# Patient Record
Sex: MALE | Employment: FULL TIME | URBAN - METROPOLITAN AREA
[De-identification: names, ages, dates, MRNs, and addresses within clinical notes are randomized per-mention and may not be internally consistent; named-entity substitution may affect disease eponyms.]

---

## 2022-07-08 ENCOUNTER — TELEPHONE (OUTPATIENT)
Dept: BEHAVIORAL HEALTH | Facility: CLINIC | Age: 47
End: 2022-07-08

## 2022-07-08 ENCOUNTER — HOSPITAL ENCOUNTER (INPATIENT)
Facility: CLINIC | Age: 47
LOS: 3 days | Discharge: HOME | End: 2022-07-15
Attending: FAMILY MEDICINE | Admitting: PSYCHIATRY & NEUROLOGY
Payer: COMMERCIAL

## 2022-07-08 DIAGNOSIS — F33.9 RECURRENT MAJOR DEPRESSIVE DISORDER, REMISSION STATUS UNSPECIFIED (H): ICD-10-CM

## 2022-07-08 DIAGNOSIS — F32.1 CURRENT MODERATE EPISODE OF MAJOR DEPRESSIVE DISORDER, UNSPECIFIED WHETHER RECURRENT (H): ICD-10-CM

## 2022-07-08 DIAGNOSIS — Z20.822 LAB TEST NEGATIVE FOR COVID-19 VIRUS: ICD-10-CM

## 2022-07-08 DIAGNOSIS — R45.851 SUICIDAL IDEATION: ICD-10-CM

## 2022-07-08 LAB
AMPHETAMINES UR QL SCN: NORMAL
BARBITURATES UR QL: NORMAL
BENZODIAZ UR QL: NORMAL
CANNABINOIDS UR QL SCN: NORMAL
COCAINE UR QL: NORMAL
OPIATES UR QL SCN: NORMAL
SARS-COV-2 RNA RESP QL NAA+PROBE: NEGATIVE

## 2022-07-08 PROCEDURE — 90791 PSYCH DIAGNOSTIC EVALUATION: CPT

## 2022-07-08 PROCEDURE — 80307 DRUG TEST PRSMV CHEM ANLYZR: CPT | Performed by: FAMILY MEDICINE

## 2022-07-08 PROCEDURE — C9803 HOPD COVID-19 SPEC COLLECT: HCPCS | Performed by: FAMILY MEDICINE

## 2022-07-08 PROCEDURE — 99285 EMERGENCY DEPT VISIT HI MDM: CPT | Mod: 25 | Performed by: FAMILY MEDICINE

## 2022-07-08 PROCEDURE — 87635 SARS-COV-2 COVID-19 AMP PRB: CPT | Performed by: FAMILY MEDICINE

## 2022-07-08 PROCEDURE — 250N000013 HC RX MED GY IP 250 OP 250 PS 637: Performed by: FAMILY MEDICINE

## 2022-07-08 PROCEDURE — 99285 EMERGENCY DEPT VISIT HI MDM: CPT | Performed by: FAMILY MEDICINE

## 2022-07-08 RX ORDER — METOPROLOL SUCCINATE 25 MG/1
25 TABLET, EXTENDED RELEASE ORAL DAILY
COMMUNITY

## 2022-07-08 RX ORDER — ACETAMINOPHEN 325 MG/1
325-650 TABLET ORAL EVERY 6 HOURS PRN
Status: ON HOLD | COMMUNITY
End: 2022-07-15

## 2022-07-08 RX ORDER — ZOLPIDEM TARTRATE 5 MG/1
5 TABLET ORAL
Status: DISCONTINUED | OUTPATIENT
Start: 2022-07-08 | End: 2022-07-12

## 2022-07-08 RX ORDER — BUPROPION HYDROCHLORIDE 75 MG/1
75 TABLET ORAL DAILY
Status: DISCONTINUED | OUTPATIENT
Start: 2022-07-09 | End: 2022-07-15 | Stop reason: HOSPADM

## 2022-07-08 RX ORDER — BUPROPION HYDROCHLORIDE 150 MG/1
150 TABLET ORAL EVERY MORNING
Status: ON HOLD | COMMUNITY
End: 2022-07-15

## 2022-07-08 RX ORDER — BUPROPION HYDROCHLORIDE 150 MG/1
150 TABLET ORAL DAILY
Status: DISCONTINUED | OUTPATIENT
Start: 2022-07-09 | End: 2022-07-15 | Stop reason: HOSPADM

## 2022-07-08 RX ORDER — FAMOTIDINE 20 MG/1
20 TABLET, FILM COATED ORAL DAILY PRN
Status: DISCONTINUED | OUTPATIENT
Start: 2022-07-08 | End: 2022-07-15 | Stop reason: HOSPADM

## 2022-07-08 RX ORDER — LISINOPRIL 10 MG/1
10 TABLET ORAL DAILY
COMMUNITY

## 2022-07-08 RX ORDER — BUPROPION HYDROCHLORIDE 75 MG/1
75 TABLET ORAL DAILY
Status: ON HOLD | COMMUNITY
End: 2022-07-15

## 2022-07-08 RX ORDER — FAMOTIDINE 20 MG/1
20 TABLET, FILM COATED ORAL 2 TIMES DAILY PRN
COMMUNITY

## 2022-07-08 RX ORDER — ROSUVASTATIN CALCIUM 10 MG/1
10 TABLET, COATED ORAL DAILY
Status: DISCONTINUED | OUTPATIENT
Start: 2022-07-09 | End: 2022-07-08

## 2022-07-08 RX ORDER — FAMOTIDINE 20 MG/1
20 TABLET, FILM COATED ORAL DAILY
Status: DISCONTINUED | OUTPATIENT
Start: 2022-07-08 | End: 2022-07-08

## 2022-07-08 RX ORDER — LORATADINE 10 MG/1
10 TABLET ORAL DAILY
Status: ON HOLD | COMMUNITY
End: 2022-07-15

## 2022-07-08 RX ORDER — ZOLPIDEM TARTRATE 5 MG/1
5 TABLET ORAL
Status: ON HOLD | COMMUNITY
End: 2022-07-15

## 2022-07-08 RX ORDER — ROSUVASTATIN CALCIUM 10 MG/1
10 TABLET, COATED ORAL DAILY
Status: DISCONTINUED | OUTPATIENT
Start: 2022-07-08 | End: 2022-07-15 | Stop reason: HOSPADM

## 2022-07-08 RX ORDER — METOPROLOL SUCCINATE 25 MG/1
25 TABLET, EXTENDED RELEASE ORAL DAILY
Status: DISCONTINUED | OUTPATIENT
Start: 2022-07-08 | End: 2022-07-15 | Stop reason: HOSPADM

## 2022-07-08 RX ORDER — ROSUVASTATIN CALCIUM 10 MG/1
10 TABLET, COATED ORAL DAILY
COMMUNITY

## 2022-07-08 RX ORDER — LISINOPRIL 5 MG/1
10 TABLET ORAL DAILY
Status: DISCONTINUED | OUTPATIENT
Start: 2022-07-08 | End: 2022-07-15 | Stop reason: HOSPADM

## 2022-07-08 RX ORDER — LORAZEPAM 0.5 MG/1
0.5 TABLET ORAL 2 TIMES DAILY PRN
Status: ON HOLD | COMMUNITY
End: 2022-07-15

## 2022-07-08 RX ADMIN — ROSUVASTATIN CALCIUM 10 MG: 10 TABLET, FILM COATED ORAL at 20:41

## 2022-07-08 RX ADMIN — RIVAROXABAN 20 MG: 10 TABLET, FILM COATED ORAL at 18:42

## 2022-07-08 RX ADMIN — LISINOPRIL 10 MG: 10 TABLET ORAL at 20:42

## 2022-07-08 RX ADMIN — METOPROLOL SUCCINATE 25 MG: 25 TABLET, EXTENDED RELEASE ORAL at 20:41

## 2022-07-08 NOTE — ED TRIAGE NOTES
Triage Assessment     Row Name 07/08/22 1238       Triage Assessment (Adult)    Airway WDL WDL       Respiratory WDL    Respiratory WDL WDL       Skin Circulation/Temperature WDL    Skin Circulation/Temperature WDL WDL       Cardiac WDL    Cardiac WDL WDL       Peripheral/Neurovascular WDL    Peripheral Neurovascular WDL WDL       Cognitive/Neuro/Behavioral WDL    Cognitive/Neuro/Behavioral WDL WDL
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
## 2022-07-08 NOTE — PHARMACY-ADMISSION MEDICATION HISTORY
Admission Medication History Completed by Pharmacy    See [facility] Admission Navigator for allergy information, preferred outpatient pharmacy, prior to admission medications and immunization status.     Medication History Sources:     Patient interview    Chart review- [facility] Notes from 7/5/2022 (nothing in surescripts)    Changes made to PTA medication list (reason):    Added: acetaminophen, bupropion, famotidine, lisinopril, loratadine, lorazepam, metoprolol, rivaroxaban, zolpidem, rosuvastatin     Deleted: celexa, hydroxyzine, risperidone     Changed: none    Prior to Admission medications    Medication Sig Last Dose Taking? Auth Provider Long Term End Date   acetaminophen (TYLENOL) 325 MG tablet Take 325-650 mg by mouth every 6 hours as needed for mild pain Unknown at Unknown time Yes Unknown, Entered By History     buPROPion (WELLBUTRIN XL) 150 MG 24 hr tablet Take 150 mg by mouth every morning With 75 mg for total of 225 mg 7/8/2022 at 0800 Yes Unknown, Entered By History     buPROPion (WELLBUTRIN) 75 MG tablet Take 75 mg by mouth daily With 150 mg for total of 225 mg 7/8/2022 at 0800 Yes Unknown, Entered By History     famotidine (PEPCID) 20 MG tablet Take 20 mg by mouth 2 times daily as needed More than a month at Unknown time Yes Unknown, Entered By History     lisinopril (ZESTRIL) 10 MG tablet Take 10 mg by mouth daily 07/07/2022 at 2200 Yes Unknown, Entered By History     loratadine (CLARITIN) 10 MG tablet Take 10 mg by mouth daily 7/7/2022 at 2200 Yes Unknown, Entered By History     LORazepam (ATIVAN) 0.5 MG tablet Take 0.5 mg by mouth 2 times daily as needed for anxiety 7/8/2022 Yes Unknown, Entered By History     metoprolol succinate ER (TOPROL XL) 25 MG 24 hr tablet Take 25 mg by mouth daily 7/7/2022 at 2200 Yes Unknown, Entered By History     rivaroxaban ANTICOAGULANT (XARELTO) 20 MG TABS tablet Take 20 mg by mouth daily (with dinner) 7/7/2022 at 1700 Yes Unknown, Entered By History      rosuvastatin (CRESTOR) 10 MG tablet Take 10 mg by mouth daily 7/7/2022 at 2200 Yes Unknown, Entered By History     zolpidem (AMBIEN) 5 MG tablet Take 5 mg by mouth nightly as needed for sleep More than a month at Unknown time Yes Unknown, Entered By History         Date completed: 07/08/22    Medication history completed by: [facility] Cel[facility]
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
## 2022-07-08 NOTE — SAFE
Charles Schoeneberger  July 8, 2022  SAFE Note    Critical Safety Issues: Aggression, Suicidal Ideation      Current Suicidal Ideation/Self-Injurious Concerns/Methods: Hitting/Punching Self and Other Patient thinking about hanging themselves, and crashing their car.       Current or Historical Inappropriate Sexual Behavior: No      Current or Historical Aggression/Homicidal Ideation: Rage      Triggers: Unknown     Guardianship Status: is his own guardian.. Guardianship paperwork is not required.    This patient is a child/adolescent: No    This patient has additional special visitor precautions: No    Updated care team: Yes:     For additional details see full LMHP assessment.       Neetu Braga MS  Licensed Mental Health Professional   Extended Care

## 2022-07-09 ENCOUNTER — TELEPHONE (OUTPATIENT)
Dept: BEHAVIORAL HEALTH | Facility: CLINIC | Age: 47
End: 2022-07-09

## 2022-07-09 PROCEDURE — 250N000013 HC RX MED GY IP 250 OP 250 PS 637: Performed by: FAMILY MEDICINE

## 2022-07-09 RX ADMIN — METOPROLOL SUCCINATE 25 MG: 25 TABLET, EXTENDED RELEASE ORAL at 20:45

## 2022-07-09 RX ADMIN — BUPROPION HYDROCHLORIDE 75 MG: 75 TABLET, FILM COATED ORAL at 09:27

## 2022-07-09 RX ADMIN — LISINOPRIL 10 MG: 10 TABLET ORAL at 20:45

## 2022-07-09 RX ADMIN — ROSUVASTATIN CALCIUM 10 MG: 10 TABLET, FILM COATED ORAL at 20:44

## 2022-07-09 RX ADMIN — BUPROPION HYDROCHLORIDE 150 MG: 150 TABLET, EXTENDED RELEASE ORAL at 09:27

## 2022-07-09 RX ADMIN — RIVAROXABAN 20 MG: 10 TABLET, FILM COATED ORAL at 20:45

## 2022-07-09 NOTE — TELEPHONE ENCOUNTER
R: Pt currently at [facility] awaiting bed placement     3:45pm- Bed search update    [facility] is at capacity.    All of [facility] locations are capped.    [facility] has very low acuity only.  Capped for acuity.  Main#[phone]    [facility] is at capacity.    [facility] has very low acuity only.  Capped at acuity.  Pt meets exclusionary criteria      [facility] posted 3 beds.  Low acuity only.  Pt meets exclusionary criteria    [facility] [place] posted 1 bed.  Voluntary Pts only.  NO 72 HH.  No hx of aggression, violence, or assault.     [facility] posted 6 beds.  NO aggressive/violent behaviors, or recent hx of such in last 2 years.  Must have cognitive ability to do programming.  Mh must be primary.  Negative covid test required. Pt meets exclusionary criteria    [facility] posted 2 low acuity beds.  COVID negative. Pt meets exclusionary criteria    [facility] is at capacity.    [facility] is at capacity.    4:11p Intake called [facility] Nurse to inquire about pt's placement preferences. [facility] Nurse to call back.     4:27p Intake received call from [facility] Nurse who inform pt is open to [facility], but prefers [facility]. Nurse notes that there has not been any problems with the pt, pt was completing a puzzle.    4:30p Pt remains on wait list pending appropriate bed availability.

## 2022-07-09 NOTE — ED NOTES
Pt has been calm and cooperative all shift. Has spent the majority of the shift in the lounge, putting a puzzle together, reading a book and engaging with other patients.

## 2022-07-09 NOTE — TELEPHONE ENCOUNTER
0552 Bed Search Update:    [facility] [place]-[facility] beds available  [facility]-No beds available  [facility]-No beds available  [facility]-No beds available  [facility]-No beds available  [facility]-No beds available  [facility]-No beds available  [facility]-No beds available  [facility]-No beds available  [facility]-No beds available.  Low acuity only.  Mixed unit adol/adult/juan antonio  [facility]-No beds available  [facility]-No beds available  [facility]-Low acuity only  [facility]-No beds available  [facility]-Low acuity only  [facility]-Low acuity only  [facility]-No beds available.  Must be on a 72 HH. No intake after 10 PM.  [facility]-No beds available  [facility]-Voluntary/[place] only. No hx violence or sexual assault.  [facility]-No beds available  [facility]-No beds available  [facility]-Meets exclusionary criteria. No recent hx violence/aggression. Must be able to program in groups.  [facility]-Low acuity only.  [facility] [place] reporting they are on divert.  No recent violence or aggression.  [facility]-No beds available  [facility]-No beds available  [facility]-No beds available    Remains on wait list.   maximum assist (25% patients effort)

## 2022-07-09 NOTE — TELEPHONE ENCOUNTER
Inpatient Bed Call Log 7/9/2022 Morning done 700a    Adults:         [facility] is posting 0 beds.     [facility] is posting 0 beds.    [facility] is posting 0 beds.    [facility] is posting 0 beds.    [facility] is posting 0 beds.    [facility] is posting 0 beds.    [facility] is posting 0 beds.    [facility] is posting 0 beds.    [facility] is posting 0 beds. Mixed unit 12+. Low acuity only.     [facility] is positing 0 beds. No aggression.     [facility] is posting 0 beds.     [facility] is posting 1 bed.     [facility] is posting 0 beds. Very low acuity.    [facility] is posting 3 beds. Low acuity.     [facility] is posting 3 beds. Low acuity only. 72 hr hold required.     [facility] is posting 1 bed. Low acuity.    [facility] is posting 0 beds. Vol only, No Hx of aggression, violence or assault. No sexual offenders. No 72 hr holds.    [facility] is posting 6 beds. (Must have the cognitive ability to do programming. No aggressive or violent behavior or recent HX in the last 2 yrs. MH must be primary.)    [facility] is posting 0 beds. Low acuity only. Violence and aggression capped.     [facility] is posting 2 beds. Low acuity, Neg Covid.     [facility] is posting 0 beds. Covid neg. Vol only. Combined adolescent and adult unit. No aggressive or violent behavior. No registered sex offenders.     [facility] is posting 10 beds. Call for details.    [facility] is posting 0 beds.    7:30am No appropriate bed available at this time.     8:12am Intake called [facility]. Pt was declined during phone screening due to acuity.

## 2022-07-10 ENCOUNTER — TELEPHONE (OUTPATIENT)
Dept: BEHAVIORAL HEALTH | Facility: CLINIC | Age: 47
End: 2022-07-10

## 2022-07-10 PROCEDURE — 250N000013 HC RX MED GY IP 250 OP 250 PS 637: Performed by: FAMILY MEDICINE

## 2022-07-10 RX ORDER — ACETAMINOPHEN 500 MG
1000 TABLET ORAL ONCE
Status: COMPLETED | OUTPATIENT
Start: 2022-07-10 | End: 2022-07-10

## 2022-07-10 RX ADMIN — ROSUVASTATIN CALCIUM 10 MG: 10 TABLET, FILM COATED ORAL at 21:06

## 2022-07-10 RX ADMIN — BUPROPION HYDROCHLORIDE 75 MG: 75 TABLET, FILM COATED ORAL at 08:57

## 2022-07-10 RX ADMIN — ACETAMINOPHEN 1000 MG: 500 TABLET ORAL at 14:13

## 2022-07-10 RX ADMIN — RIVAROXABAN 20 MG: 10 TABLET, FILM COATED ORAL at 19:23

## 2022-07-10 RX ADMIN — LISINOPRIL 10 MG: 10 TABLET ORAL at 21:06

## 2022-07-10 RX ADMIN — METOPROLOL SUCCINATE 25 MG: 25 TABLET, EXTENDED RELEASE ORAL at 21:06

## 2022-07-10 RX ADMIN — ACETAMINOPHEN 1000 MG: 500 TABLET ORAL at 09:42

## 2022-07-10 RX ADMIN — BUPROPION HYDROCHLORIDE 150 MG: 150 TABLET, EXTENDED RELEASE ORAL at 08:57

## 2022-07-10 NOTE — ED PROVIDER NOTES
[facility] ED Mental Health Handoff Note:       Brief HPI:  This is a 46 year old male signed out to me by Dr. Escamilla.  See initial ED Provider note for full details of the presentation. Interval history is pertinent for no new events.    Home meds reviewed and ordered/administered: Yes    Medically stable for inpatient mental health admission: Yes.    Evaluated by mental health: Yes. The recommendation is for inpatient mental health treatment. Bed search in process    Safety concerns: At the time I received sign out, there were no safety concerns.    Hold Status:  Active Orders   N/A            Exam:   Patient Vitals for the past 24 hrs:   BP Temp Temp src Pulse Resp SpO2   07/09/22 2019 114/74 97.7  F (36.5  C) Oral 74 18 98 %   07/09/22 0910 117/78 97.7  F (36.5  C) Oral 72 18 99 %           ED Course:    Medications   buPROPion (WELLBUTRIN XL) 24 hr tablet 150 mg (150 mg Oral Given 7/9/22 0927)   buPROPion (WELLBUTRIN) tablet 75 mg (75 mg Oral Given 7/9/22 0927)   lisinopril (ZESTRIL) tablet 10 mg (10 mg Oral Given 7/9/22 2045)   metoprolol succinate ER (TOPROL XL) 24 hr tablet 25 mg (25 mg Oral Given 7/9/22 2045)   rivaroxaban ANTICOAGULANT (XARELTO) tablet 20 mg (20 mg Oral Given 7/9/22 2045)   zolpidem (AMBIEN) tablet 5 mg (has no administration in time range)   rosuvastatin (CRESTOR) tablet 10 mg (10 mg Oral Given 7/9/22 2044)   famotidine (PEPCID) tablet 20 mg (has no administration in time range)            There were no significant events during my shift.    Patient was signed out to the oncoming provider      Impression:    ICD-10-CM    1. Current moderate episode of major depressive disorder, unspecified whether recurrent (H)  F32.1    2. Suicidal ideation  R45.851        Plan:    1. Awaiting inpatient mental health admission/transfer.      RESULTS:   No results found for this visit on 07/08/22 (from the past 24 hour(s)).          MD Haris Rubio David,  MD  07/10/22 0707

## 2022-07-10 NOTE — ED NOTES
Triage & Transition Services, Extended Care     Charles Schoeneberger  July 10, 2022    Philip is followed related to Boarding Status. Please see initial DEC Crisis Assessment completed for complete assessment information. Medical record is reviewed. While patient is in the ED, care team is working towards Learn and Demonstrate at Least One Skill Focused on Crisis Stabilization.     There are not significant status changes. Full DEC Reassessment is not recommended at this time. Extended Care continues to be available for review of changes to initial crisis state resulting in this encounter.     Extended Care will follow and meet with patient/family/care team as able or requested.     Neetu Braga MS   [facility], Extended Care   [phone]

## 2022-07-10 NOTE — TELEPHONE ENCOUNTER
Inpatient Bed Call Log 7/10/2022 Morning done 7am    Adults:            Within [facility] is posting 0 beds.     [facility] is posting 0 beds.    [facility] is posting 0 beds.    [facility] is posting 0 beds.    [facility] is posting 0 beds.    [facility] is posting 0 beds.    [facility] is posting 0 beds.    [facility] is posting 0 beds.    [facility] is posting 1 bed. Mixed unit 12+. Low acuity only.     [facility] is positing 0 beds. No aggression.     [facility] is posting 0 beds.     [facility] is posting 2 beds.     [facility] is posting 0 beds. Very low acuity.    [facility] is posting 2 beds. Low acuity.     [facility] is posting 2 beds. Low acuity only. 72 hr hold required.     [facility] is posting 1 bed. Low acuity.     is posting 1 bed. Vol only, No Hx of aggression, violence or assault. No sexual offenders. No 72 hr holds.    [facility] is posting 7 beds. (Must have the cognitive ability to do programming. No aggressive or violent behavior or recent HX in the last 2 yrs. MH must be primary.)    [facility] is posting 0 beds. Low acuity only. Violence and aggression capped.     [facility] is posting 2 beds. Low acuity, Neg Covid.     [facility] is posting 0 beds. Covid neg. Vol only. Combined adolescent and adult unit. No aggressive or violent behavior. No registered sex offenders.     [facility] is posting 10 beds. Call for details.    [facility] is posting 0 beds.    7:30am No appropriate bed available.

## 2022-07-10 NOTE — TELEPHONE ENCOUNTER
R: 3:35pm- Bed Search Update: [facility] is at capacity.  [facility] is at capacity.  [facility] is at capacity.  [facility] is at capacity.  [facility] is at capacity.  [facility] is at capacity.  [facility] posted 1 bed.  Mixed unit with adolescents, adults, and geriatric patients.  No aggression.  No 72hh.  [facility] is at [facility].  [facility] posted 2 beds for very low acuity only.   [facility] posted 2 beds for very low acuity.    [facility] posted 1 bed for very low acuity.    [facility] is at capacity.  [facility] [place] posted 1 bed.  Voluntary only.  No 72HH.  No aggression/assault, violence.    [facility] posted 6 beds.  Must have cognitive ability to do programming.  No aggressive/violent behavior or recent hx of such in last 2 yrs.  MH must be primary.  Negative COVID test required.  Phone: [phone]/ Fax: [phone].  Declined. [facility] is at capacity.  [facility] posted 2 low acuity bed.    [facility] is at capacity.  [facility] posted 1 bed.  Mixed unit.  No aggression.     Pt remains on waitlist pending available bed.

## 2022-07-10 NOTE — ED NOTES
Slept through the night.  Was on a 1:1 due to use of CPAP.  No behaviors observed during the night shift.

## 2022-07-11 PROCEDURE — 250N000013 HC RX MED GY IP 250 OP 250 PS 637: Performed by: FAMILY MEDICINE

## 2022-07-11 RX ADMIN — LISINOPRIL 10 MG: 10 TABLET ORAL at 21:53

## 2022-07-11 RX ADMIN — BUPROPION HYDROCHLORIDE 75 MG: 75 TABLET, FILM COATED ORAL at 08:32

## 2022-07-11 RX ADMIN — ROSUVASTATIN CALCIUM 10 MG: 10 TABLET, FILM COATED ORAL at 21:53

## 2022-07-11 RX ADMIN — RIVAROXABAN 20 MG: 10 TABLET, FILM COATED ORAL at 19:10

## 2022-07-11 RX ADMIN — METOPROLOL SUCCINATE 25 MG: 25 TABLET, EXTENDED RELEASE ORAL at 21:53

## 2022-07-11 RX ADMIN — BUPROPION HYDROCHLORIDE 150 MG: 150 TABLET, EXTENDED RELEASE ORAL at 08:32

## 2022-07-11 NOTE — ED NOTES
"Triage & Transition Services, Extended Care     Therapy Progress Note    Patient: Philip goes by \"Philip,\" uses he/him pronouns  Date of Service: July 11, 2022  Site of Service: [facility]  Patient was seen in-person.     Presenting problem:   Philip is followed related to Long wait time for admission: 74+ hours in the ED. Please see initial DEC/[facility] Crisis Assessment completed by Neetu Omi on 7.8.2022 for complete assessment information. Notable concerns include suicidal ideation and increase in symptoms and aggressive behavior.     Individuals Present: Philip & Marcy Chin, [facility]    Session start: 11:25am   Session end: 11:48am   Session duration in minutes: 23 minutes  Session number: 1  Anticipated number of sessions or this episode of care: 1-3  CPT utilized: 83918 - Psychotherapy (with patient) - 30 (16-37*) min        Current Presentation:   Pt shared that his mood have felt unstable however, while in the ED things have felt better. He reported believes it is due to being disconnected from the stress of home and work. Pt expressed that he has actively been trying to connect with outpatient services however, is concerned about recent changes in mood and anger outburst. Pt identified that at sometime in the future he would like to reduce or taper off medication however, at this time he does not believe his medication is effective and would like support adjusting it. Pt was open to reviewing outpatient services and discussing psychiatry consult. Pt appeared anxious and was hesitant initially. He warmed to the idea when he was assured that he did not mean creating a discharge plan for today however, adding additional support while he is in the ED and starting the process to increase support in the community as well. Pt expressed that his goal is to return home however, today does not feel like he is stable to do so as he is very concerned things would go right back to how it was.  Pt has a therapist he meets " with weekly and is currently on the [facility] list [facility]. Pt has also spoke with [facility] about PHP however, the estimated wait time for opening in PHP is unknown.      Mental Status Exam:   Appearance: awake, alert  Attitude: cooperative  Eye Contact: good  Mood: anxious  Affect: intensity is blunted  Speech: clear, coherent  Psychomotor Behavior: no evidence of tardive dyskinesia, dystonia, or tics  Thought Process:  logical  Associations: no loose associations  Thought Content: active suicidal ideation present, no auditory hallucinations present and no visual hallucinations present  Insight: good  Judgement: intact  Oriented to: time, person, and place  Attention Span and Concentration: intact  Recent and Remote Memory: intact    Diagnosis:     296.30 (F33.9) Major Depressive Disorder, Recurrent Episode, Unspecified _ and With mixed features - primary and - by history     300.02 (F41.1) Generalized Anxiety Disorder - by history       Therapeutic Intervention(s):   Provided active listening, unconditional positive regard, and validation. Engaged in safety planning.  Identified and practiced coping skills.    Treatment Objective(s) Addressed:   The focus of this session was on rapport building, assessing safety and exploring obstacles to safety in the community       Case Management:   Writer spoke with pt's wife Alla via phone. She expressed concern for pt and his medication. Pt was seen at [facility] on Tuesday and discharged home with no real plan. She shared that things continued to decline for pt. Together they actively sought support and reached out to [facility] residence on Friday however, she was encouraged to bring pt into the ED because the  through [facility] believed he needed a high level of care. She shared that she would like pt to come home however, would like to see his medications reviewed and stabilization first as she is concerned things  would continue to decline. She noted that on Friday pt came angry and broke items and punched holes in the walls which is out of character for him.     General Recommendations:   Continue to monitor for harm. Consider: Allow family calls/visits and Listen in a neutral, non-judgmental way. Offer reassurance    Plan:   Inpatient mental health admission for stabilization and medication management.   Plan for Care reviewed with Assigned Medical Provider? Yes: Dr. Leonard. Discussed possible psychiatry consult however, at this time plan is for inpatient mental health admission.      Marcy Chin, [facility]   Licensed Mental Health Professional (LMHP), [facility]  [phone]

## 2022-07-12 PROBLEM — R45.851 SUICIDAL IDEATION: Status: ACTIVE | Noted: 2022-07-12

## 2022-07-12 PROCEDURE — 250N000013 HC RX MED GY IP 250 OP 250 PS 637: Performed by: FAMILY MEDICINE

## 2022-07-12 PROCEDURE — 99223 1ST HOSP IP/OBS HIGH 75: CPT | Mod: AI | Performed by: PSYCHIATRY & NEUROLOGY

## 2022-07-12 PROCEDURE — 250N000013 HC RX MED GY IP 250 OP 250 PS 637

## 2022-07-12 PROCEDURE — 124N000002 HC R&B MH UMMC

## 2022-07-12 RX ORDER — TRAZODONE HYDROCHLORIDE 50 MG/1
50 TABLET, FILM COATED ORAL
Status: DISCONTINUED | OUTPATIENT
Start: 2022-07-12 | End: 2022-07-15 | Stop reason: HOSPADM

## 2022-07-12 RX ORDER — ACETAMINOPHEN 325 MG/1
650 TABLET ORAL EVERY 4 HOURS PRN
Status: DISCONTINUED | OUTPATIENT
Start: 2022-07-12 | End: 2022-07-15 | Stop reason: HOSPADM

## 2022-07-12 RX ORDER — HYDROXYZINE HYDROCHLORIDE 25 MG/1
25 TABLET, FILM COATED ORAL EVERY 4 HOURS PRN
Status: DISCONTINUED | OUTPATIENT
Start: 2022-07-12 | End: 2022-07-15 | Stop reason: HOSPADM

## 2022-07-12 RX ORDER — OLANZAPINE 10 MG/1
10 TABLET ORAL 3 TIMES DAILY PRN
Status: DISCONTINUED | OUTPATIENT
Start: 2022-07-12 | End: 2022-07-15 | Stop reason: HOSPADM

## 2022-07-12 RX ORDER — OLANZAPINE 10 MG/2ML
10 INJECTION, POWDER, FOR SOLUTION INTRAMUSCULAR 3 TIMES DAILY PRN
Status: DISCONTINUED | OUTPATIENT
Start: 2022-07-12 | End: 2022-07-15 | Stop reason: HOSPADM

## 2022-07-12 RX ORDER — MAGNESIUM HYDROXIDE/ALUMINUM HYDROXICE/SIMETHICONE 120; 1200; 1200 MG/30ML; MG/30ML; MG/30ML
30 SUSPENSION ORAL EVERY 4 HOURS PRN
Status: DISCONTINUED | OUTPATIENT
Start: 2022-07-12 | End: 2022-07-15 | Stop reason: HOSPADM

## 2022-07-12 RX ORDER — POLYETHYLENE GLYCOL 3350 17 G/17G
17 POWDER, FOR SOLUTION ORAL DAILY PRN
Status: DISCONTINUED | OUTPATIENT
Start: 2022-07-12 | End: 2022-07-15 | Stop reason: HOSPADM

## 2022-07-12 RX ORDER — ZOLPIDEM TARTRATE 5 MG/1
5 TABLET ORAL
Status: DISCONTINUED | OUTPATIENT
Start: 2022-07-12 | End: 2022-07-15 | Stop reason: HOSPADM

## 2022-07-12 RX ADMIN — ROSUVASTATIN CALCIUM 10 MG: 10 TABLET, FILM COATED ORAL at 20:21

## 2022-07-12 RX ADMIN — LISINOPRIL 10 MG: 10 TABLET ORAL at 20:00

## 2022-07-12 RX ADMIN — METOPROLOL SUCCINATE 25 MG: 25 TABLET, EXTENDED RELEASE ORAL at 20:00

## 2022-07-12 RX ADMIN — RIVAROXABAN 20 MG: 10 TABLET, FILM COATED ORAL at 20:21

## 2022-07-12 RX ADMIN — HYDROXYZINE HYDROCHLORIDE 25 MG: 25 TABLET, FILM COATED ORAL at 15:09

## 2022-07-12 RX ADMIN — BUPROPION HYDROCHLORIDE 75 MG: 75 TABLET, FILM COATED ORAL at 08:59

## 2022-07-12 RX ADMIN — BUPROPION HYDROCHLORIDE 150 MG: 150 TABLET, EXTENDED RELEASE ORAL at 08:59

## 2022-07-12 ASSESSMENT — ACTIVITIES OF DAILY LIVING (ADL)
ADLS_ACUITY_SCORE: 31
ORAL_HYGIENE: INDEPENDENT
ADLS_ACUITY_SCORE: 31
DRESS: INDEPENDENT
ORAL_HYGIENE: INDEPENDENT
ADLS_ACUITY_SCORE: 31
ADLS_ACUITY_SCORE: 31
LAUNDRY: WITH SUPERVISION
DRESS: INDEPENDENT;SCRUBS (BEHAVIORAL HEALTH)
LAUNDRY: WITH SUPERVISION
HYGIENE/GROOMING: INDEPENDENT
HYGIENE/GROOMING: INDEPENDENT

## 2022-07-12 ASSESSMENT — LIFESTYLE VARIABLES
SKIP TO QUESTIONS 9-10: 1
AUDIT-C TOTAL SCORE: 1

## 2022-07-12 NOTE — PLAN OF CARE
Goal Outcome Evaluation:    S: Pt is a 46 year old male from [facility] ED admitted for increased depression, inability to function, and suicidal ideation.    B: Pt has Hx. Hodgkin lymphoma status post chemo and radiation, DVT, major depression. Reports having issues with controlling anger, sometimes leads to destruction of property and yelling to his wife. Admits to suicidal thoughts with potential plan considered include hanging self, jumping off a bridge, or crushing vehicle. States he does not want to die and does not have intent to commit suicide but these thoughts increase and become more difficult to control during his anger outburst.    A: Pt presents with a flat/blunted affect, sad mood. Endorses anxiety 6/10, declined intervention, depression 4/10, SI thoughts ( fights the urge ), does not want to act on the thoughts. Denied physical pain, HI, hallucinations, and contracted for safety.    R: Continue to monitor pt and intervent as need be. Continue suicide/SIB/assault precautions.

## 2022-07-12 NOTE — PLAN OF CARE
07/12/22 1027   Patient Belongings   Did you bring any home meds/supplements to the hospital?  No   Belongings Search Yes   Clothing Search Yes   Second Staff Harvinder MCKINLEY   Comment This writer is only transcrbing belongings     Belongings: C-PAP, distilled water, phone, 2 shorts, underwear, 2 t-shirts, hoodie, 2 books, 2 magazines            A               Admission:  I am responsible for any personal items that are not sent to the safe or pharmacy.  [facility] is not responsible for loss, theft or damage of any property in my possession.    Signature:  _________________________________ Date: _______  Time: _____                                              Staff Signature:  ____________________________ Date: ________  Time: _____      2nd Staff person, if patient is unable/unwilling to sign:    Signature: ________________________________ Date: ________  Time: _____     Discharge:  [facility] has returned all of my personal belongings:    Signature: _________________________________ Date: ________  Time: _____                                          Staff Signature:  ____________________________ Date: ________  Time: _____

## 2022-07-12 NOTE — ED PROVIDER NOTES
ED Observation Progress Note  [facility]  Note Date: 7/11/2022    Charles Schoeneberger MRN: [id number]   Age: 46 year old YOB: 1975     Interval History   About the same today.  Vitals signs stable.  Tolerating medications and treatment plan without significant side effects or problems.  Eating and voiding well.  No new concerns today.     Physical Exam   /74   Pulse 72   Temp 98.4  F (36.9  C) (Oral)   Resp 18   SpO2 99%   Physical Exam  General:  Appears stated age.   HENT: MMM, no oropharyngeal lesions  Eyes: PERRL, normal sclerae   Cardio: Regular rate, extremities well perfused  Resp: Normal work of breathing, normal respiratory rate  Neuro: alert and fully oriented. CN II-XII grossly intact. Grossly normal strength and sensation in all extremities.   MSK: no deformities. Grossly normal ROM.  Integumentary/Skin: no rash visualized, normal color  Psych: Anxious mood and affect, no behavior dyscontrol. No acute SI, HI or hallucinations. Thought process and Insight are intact.     Results       Assessments & Plan (with Medical Decision Making)   Charles Schoeneberger is a 46 year old male admitted to ED Observation status with concerns for depression and inability to function. He is awaiting an inpatient psych bed. He remains voluntary. Extended Care DEC is working supportive care while he waits for a bed in the ED.     On this date, the patient did not require medications for agitation, and did not require restraints/seclusion for patient and/or provider safety.     Notable events and plan updates today: None    The patient's condition is such that further monitoring for psychiatric stabilization and/or coordination of a safe disposition is still indicated. The observation plan includes serial assessments of psychiatric condition, potential administration of medications if indicated, further disposition pending the patient's psychiatric course during the  monitoring period.     --  Evelio Leonard MD  [facility] EMERGENCY DEPARTMENT  7/11/2022        Evelio Leonard MD  07/11/22 1953

## 2022-07-12 NOTE — ED NOTES
Pt has been pleasant through out the evening into the night time. Pt slept well through out the night. No behaviors noted. Calm and cooperative

## 2022-07-12 NOTE — PROGRESS NOTES
Checked-in with RN for Philip, patient brought CPAP from home and will be using that during his stay at the hospital.

## 2022-07-12 NOTE — PLAN OF CARE
Problem: Anxiety  Goal: Anxiety Reduction or Resolution  Outcome: Ongoing, Progressing     Problem: Suicide Risk  Goal: Absence of Self-Harm  7/12/2022 1731 by Keara Gary, RN  Outcome: Ongoing, Progressing  7/12/2022 1730 by Keara Gary, RN  Outcome: Ongoing, Progressing        Patient up and visible on the unit intermittently, isolative and withdrawn for the most part, patient flat and blunted affect on approach, endorsed anxiety and depression @ 4, denies SI/SIB/hallucination, denies pain, patient is iris for safety, safety checks in place every 15 minutes, patient keeps to self, does not engage with peers, medication compliant with no observed and stated side effects, has good appetite and drinking well, able to make needs known, no other known concerns at this time, will monitor and offer support for the rest of the shift.
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
## 2022-07-12 NOTE — PROGRESS NOTES
SPIRITUAL HEALTH SERVICES  SPIRITUAL ASSESSMENT Progress Note  [facility] ([facility]) Station 20     REFERRAL SOURCE: I did try to visit patient Philip per [facility]  referral. However, pt was not available and he is new for the unit. I informed my visit attempts for the unit staff and let them know and let myself open incase pt needs for the  support.    PLAN: I will remain open to provide spiritual care for the pt as needed.    Emily Hou M.Div. ([place]), M.Th., D.Min., [facility]  Staff   Pager [phone]

## 2022-07-13 PROCEDURE — 124N000002 HC R&B MH UMMC

## 2022-07-13 PROCEDURE — 250N000013 HC RX MED GY IP 250 OP 250 PS 637

## 2022-07-13 PROCEDURE — 99233 SBSQ HOSP IP/OBS HIGH 50: CPT | Mod: GC | Performed by: PSYCHIATRY & NEUROLOGY

## 2022-07-13 PROCEDURE — G0177 OPPS/PHP; TRAIN & EDUC SERV: HCPCS

## 2022-07-13 RX ORDER — LURASIDONE HYDROCHLORIDE 20 MG/1
20 TABLET, FILM COATED ORAL
Status: DISCONTINUED | OUTPATIENT
Start: 2022-07-13 | End: 2022-07-14

## 2022-07-13 RX ADMIN — TRAZODONE HYDROCHLORIDE 50 MG: 50 TABLET ORAL at 20:46

## 2022-07-13 RX ADMIN — BUPROPION HYDROCHLORIDE 150 MG: 150 TABLET, EXTENDED RELEASE ORAL at 08:24

## 2022-07-13 RX ADMIN — HYDROXYZINE HYDROCHLORIDE 25 MG: 25 TABLET, FILM COATED ORAL at 12:19

## 2022-07-13 RX ADMIN — ROSUVASTATIN CALCIUM 10 MG: 10 TABLET, FILM COATED ORAL at 20:46

## 2022-07-13 RX ADMIN — LURASIDONE HYDROCHLORIDE 20 MG: 20 TABLET, FILM COATED ORAL at 17:48

## 2022-07-13 RX ADMIN — RIVAROXABAN 20 MG: 10 TABLET, FILM COATED ORAL at 19:23

## 2022-07-13 RX ADMIN — LISINOPRIL 10 MG: 10 TABLET ORAL at 20:46

## 2022-07-13 RX ADMIN — METOPROLOL SUCCINATE 25 MG: 25 TABLET, EXTENDED RELEASE ORAL at 20:46

## 2022-07-13 RX ADMIN — BUPROPION HYDROCHLORIDE 75 MG: 75 TABLET, FILM COATED ORAL at 09:50

## 2022-07-13 ASSESSMENT — ACTIVITIES OF DAILY LIVING (ADL)
ADLS_ACUITY_SCORE: 31
ADLS_ACUITY_SCORE: 31
HYGIENE/GROOMING: INDEPENDENT;PROMPTS
ADLS_ACUITY_SCORE: 31
LAUNDRY: WITH SUPERVISION
ADLS_ACUITY_SCORE: 31
HYGIENE/GROOMING: INDEPENDENT
ORAL_HYGIENE: INDEPENDENT;PROMPTS
ADLS_ACUITY_SCORE: 41
ADLS_ACUITY_SCORE: 31
DRESS: SCRUBS (BEHAVIORAL HEALTH)
ADLS_ACUITY_SCORE: 41
ORAL_HYGIENE: INDEPENDENT
LAUNDRY: WITH SUPERVISION
ADLS_ACUITY_SCORE: 31
DRESS: INDEPENDENT

## 2022-07-13 NOTE — PLAN OF CARE
"Pt has been intermittently visible in the milieu today; he attended dance movement group and OT clinic. He struggled to participate fully but attempted to and engaged by watching others participate. He presented with flat, tense affect and anxious mood during his interactions with writer and other patients. Around noon he approached writer stating he was feeling \"emotionally unstable\" and wanted a medication to help him. He expanded on this by saying he felt more \"tearful\" and \"on edge.\" He requested PRN Hydroxyzine (25mg) and stated that it had worked well for him yesterday. Appeared effective for him today as well.    Problem: Behavioral Health Plan of Care  Goal: Plan of Care Review  Recent Flowsheet Documentation  Taken 7/13/2022 1254 by Kimmy Colon  Plan of Care Reviewed With: patient  Patient Agreement with Plan of Care: agrees     "

## 2022-07-13 NOTE — PLAN OF CARE
[facility] Occupational Therapy Group Intervention Note     07/13/22 1204   Group Therapy Session   Group Attendance attended group session   Time Session Began 1110   Time Session Ended 1200   Total Time patient participated (minutes) 45   Total # Attendees 5   Group Type psychoeducation;recreation   Group Topic Covered structured socialization;leisure exploration/use of leisure time   Group Session Detail Leisure exploration and participation group offered for increased intrinsic motivation to engage in social, non-obligatory occupations via a group game. Structured group was used to promote positive milieu interaction and collaboration.    Patient Response/Contribution cooperative with task;discussed personal experience with topic;listened actively   Patient Response Detail Full participation with a somewhat restricted affect. Demonstrated delayed response to prompted questions, however, organized and thoughtful in responses r/t values and positive memories.      Alla Gutierrez OT on 7/13/2022 at 12:05 PM

## 2022-07-13 NOTE — PLAN OF CARE
[facility] Occupational Therapy Group Intervention Note       07/13/22 1705   Group Therapy Session   Group Attendance attended group session   Time Session Began 1310   Time Session Ended 1450   Total Time patient participated (minutes) 90   Total # Attendees 6   Group Type task skill;life skill   Group Topic Covered coping skills/lifestyle management    clinic - coping skill exploration, creative expression within personally meaningful activities, and observation of social, cognitive, and kinesthetic performance skills     Patient Response/Contribution cooperative with task;organized   Patient Response Detail IND to select, initiate, and follow-through with a detailed Paint-by-Sticker project. Demonstrated great attention for ~45 minutes. Safe with us of scissors. Sat quietly and listened to others converse for duration of group.      Alla Gutierrez OT on 7/13/2022 at 5:06 PM

## 2022-07-13 NOTE — PLAN OF CARE
Patient on 1:1 SIO, using CPAP  at night when sleeping. Patient no complaints of pain and discomfort. No mental health symptoms observed so far. No safety or behavioral issues noted. Will continue to monitor patient's status and provide therapeutic interventions as needed.  Patient had 7 total hours of sleep this shift. Will continue with current plan of care.   Problem: Sleep Disturbance  Goal: Adequate Sleep/Rest  Outcome: Ongoing, Progressing

## 2022-07-13 NOTE — PLAN OF CARE
Problem: Plan of Care - These are the overarching goals to be used throughout the patient stay.    Goal: Absence of Hospital-Acquired Illness or Injury  Outcome: Ongoing, Progressing  Intervention: Identify and Manage Fall Risk  Recent Flowsheet Documentation  Taken 7/13/2022 1625 by Dhruv Valenzuela RN  Safety Promotion/Fall Prevention:    check orthostatic blood pressure    clutter free environment maintained    nonskid shoes/slippers when out of bed    safety round/check completed  Intervention: Prevent Skin Injury  Recent Flowsheet Documentation  Taken 7/13/2022 1625 by Dhruv Valenzuela RN  Body Position: position changed independently     Problem: Suicide Risk  Goal: Absence of Self-Harm  Outcome: Ongoing, Progressing     Problem: Behavioral Health Plan of Care  Goal: Absence of New-Onset Illness or Injury  Outcome: Ongoing, Progressing  Intervention: Identify and Manage Fall Risk  Recent Flowsheet Documentation  Taken 7/13/2022 1625 by Dhruv Valenzuela RN  Safety Measures:    safety rounds completed    suicide assessment completed    suicide check-in completed  Goal: Develops/Participates in Therapeutic [place] to Support Successful Transition  Outcome: Ongoing, Progressing  Intervention: Foster Therapeutic [place]  Recent Flowsheet Documentation  Taken 7/13/2022 1625 by Dhruv Valenzuela RN  Trust Relationship/Rapport:    care explained    empathic listening provided    questions encouraged    questions answered    reassurance provided    thoughts/feelings acknowledged   Goal Outcome Evaluation:    Plan of Care Reviewed With: patient      Pt present in the milieu, socialized with selected peers,  and did not  participated in group activities despite encouragement from staff. Pt endorsed depression 3/10 and denies all other psych symptoms and contracted for safety. Pt used self meditation and pt stated it help improved his mood. Speech clear and coherent and maintained flat affect. Received prn  trazodone for sleep and cooperated with all medications. Good appetite and came out for HS snacks.

## 2022-07-13 NOTE — PROGRESS NOTES
"    ----------------------------------------------------------------------------------------------------------  [facility]  Psychiatric Progress Note  Hospital Day #1    Charles Schoeneberger MRN# [id number]   Age: 46 year old YOB: 1975   Date of Admission: 7/8/2022     Subjective   The patient was discussed with the treatment team and chart notes were reviewed.      Identifier: Charles Schoeneberger is a 46 year old  male with a past psychiatric history of major depression, suicidal ideation, and suicide attempt admitted from the  ER on 07/12/2022 due to concern for SI, out of control behaviors and aggression in the context of psychosocial stressors including loss and chronic mental health issues. . Patient is on hospital day #1. Assessment is ongoing with an evolving differential including adult ADHD vs intermittent explosive disorder vs complex bereavement disorder.     Sleep:  7 hours (07/13/22 0600)  Prescribed Medications: Taken as prescribed )  PRN Psychiatric Medications: acetaminophen, alum & mag hydroxide-simethicone, famotidine, hydrOXYzine, OLANZapine **OR** OLANZapine, polyethylene glycol, traZODone, [Held by provider] zolpidem       Overnight Nursing Notes/Staff Report:  \"Patient on 1:1 SIO, using CPAP  at night when sleeping. Patient no complaints of pain and discomfort. No mental health symptoms observed so far. No safety or behavioral issues noted. Will continue to monitor patient's status and provide therapeutic interventions as needed.  Patient had 7 total hours of sleep this shift. Will continue with current plan of care.\"    \"Patient up and visible on the unit intermittently, isolative and withdrawn for the most part, patient flat and blunted affect on approach, endorsed anxiety and depression @ 4, denies SI/SIB/hallucination, denies pain, patient is iris for safety, safety checks in place every 15 minutes, patient keeps to self, does " "not engage with peers, medication compliant with no observed and stated side effects, has good appetite and drinking well, able to make needs known, no other known concerns at this time, will monitor and offer support for the rest of the shift.\"    Patient interview:    Patient was interviewed in the conference room. Patient noted he wasn't comfortable with a 1:1 SIO that was initiated due to CPAP and ligature risk. When discussing his anger and physical outbursts, patient noted that minor stressors will \"set him off\" and stated that these episodes started about 6 weeks ago and mild and have gotten noticeably worse since last Friday (7/8). Patient noted that getting some closure with the recent family deaths by burial commencements has helped with this anger. Patient noted some feelings of sadness will come up randomly and will start crying. This is a new experience. Care team discussed the significance of these losses and that his current hospitilization serves as means by which he can become mentally stable during this crisis. Patient inquired about medication changes. Care team asked about the most concerning symptoms and patient stated that symptoms similar to ADHD are the most concerning and are \"constant\". DBT was discussed with patient to help with some of the anxiety symptoms. Addition of new medication LURASIDONE was discussed with patient as well as the potential side effects. Patient consented to start taking this additional medication. Patient asked about [place] [facility] and care team discussed starting DBT following the course in [place]. Patient also asked about potential discharge and team mentioned Friday. Patient denied SI but noted feelings of sadness and noted that he doesn't feel suicidal but doesn't want the feelings of sadness to continue when he does get them. Patient was told that we would see him again tomorrow.      ROS   ROS was negative unless noted above.       Objective   Vitals:  Temp: " "97.1  F (36.2  C) (Temp  Min: 97.1  F (36.2  C)  Max: 98.3  F (36.8  C))    (No data recorded)  SpO2: 100 % (SpO2  Min: 98 %  Max: 100 %)  Pulse: 87 (Pulse  Min: 79  Max: 87)  BP: 110/71  Systolic (24hrs), Av , Min:110 , Max:122   Diastolic (24hrs), Av, Min:71, Max:80    Mental Status Examination:  Oriented to:  Grossly Oriented  General: Awake and Alert  Appearance:  appears stated age, Grooming is adequate and Dressed in hospital scrubs  Behavior:  calm, cooperative and engaged  Eye Contact:  good  Psychomotor:  restless and fidgeting; no catatonia present  Speech:  soft volume/tone  Language: Fluent in English with appropriate syntax and vocabulary.  Mood:  \"anxious\"  Affect:  appropriate and congruent with mood  Thought Process:  coherent, linear, logical and goal directed  Thought Content: No SI/HI/AH/VH and does not appear to be responding to internal stimuli; No apparent delusions  Associations:  intact  Insight:  good  Judgment:  good  Attention Span: grossly intact  Concentration:  grossly intact  Recent and Remote Memory:  grossly intact, but endorsed transient periods of short-term memory loss recently  Fund of Knowledge: average     Allergies     Allergies   Allergen Reactions     Prochlorperazine Anaphylaxis     \"I needed epi to be brought back\"     Cats      Dogs      Pollen Extract      Seasonal Allergies         Labs & Imaging   No results found for this or any previous visit (from the past 24 hour(s)).    Trending Labs: none     Assessment   Diagnostic Impression:  Charles Schoeneberger is a 46 year old  male with a past psychiatric history of major depression, suicidal ideation, and suicide attempt admitted from the  ER on 2022 due to concern for SI, out of control behaviors and aggression in the context of psychosocial stressors including loss and chronic mental health issues.   Significant symptoms include SI, SIB, aggression, depressed, mood lability, sleep issues, poor " "frustration tolerance and impulsive. His last psychiatric hospitalization was in 2012.  Current psychosocial stressors include loss, chronic mental health issues and family dynamics which he has been coping with by SIB, acting out to self, acting out to others and aggression.  Patient's support system includes family.  Substance use does not appear to be playing a contributing role in the patient's presentation.  There is no known genetic loading. Medical history does appear to be significant for MDD, DVT, and Hodgkin's Lymphoma. The MSE is notable for passive suicidal ideation, fidgeting, tearfullness, and feeling of \"emotionally unstable\". He reports self injurious behaviors of slapping himself in the face. His definitive diagnosis is still in evolution; differential includes adult ADHD w/ concurrent major depressive disorder, intermittent explosive disorder, and persistent complex bereavement disorder.  Additionally, he has traits of major depressive disorder which interfere with his ability to adhere with the treatment plan.  Optimization of medications to target these symptom clusters in addition to evaluation of adquate outpatient supports will be targets for treatment during this admission.      Given that he currently has SI, out of control behaviors and aggression, patient warrants inpatient psychiatric hospitalization to maintain his safety. Disposition pending clinical stabilization, medication optimization and development of an appropriate discharge plan.       Principal Diagnosis:     MDD    Secondary psychiatric diagnoses of concern this admission:     Adult ADHD    Intermittent explosive disorder    Complex bereavement disorder    Psychiatric course:  Charles Schoeneberger was admitted to [facility] as a voluntary patient. His PTA buproprion was continued.     Charles Schoeneberger continued to meet criteria for inpatient hospitalization, medication optimization, inpatient stabilization, and " appropriate discharge planning.    (7/13/2022) - Start lurasidone (LATUDA) 20 mg once per day WITH SUPPER     Medical course:   Charles Schoeneberger was physically examined by the ED prior to being transferred to the unit and was found to be medically stable and appropriate for admission.      Plan   Today's Changes:     Start lurasidone (LATUDA) 20 mg once per day WITH SUPPER   _______________________________________________________________________  Psychiatric diagnoses and management:  Principal Diagnosis:     Major Depressive Disorder recurrent  o Buproprion, 150 mg, daily; buproprion, 75 mg, daily  o lurasidone (LATUDA), 20 mg, once per day WITH SUPPER    Secondary Psychiatric Diagnoses:     Evolving differential    Additional Planning:    Continue to monitor for and stabilize: SI, SIB, aggression, irritable, mood lability and poor frustration tolerance    Patient will be treated in therapeutic milieu with appropriate individual and group therapies as described.    Scheduled Medications (summary):    buPROPion  150 mg Oral Daily     buPROPion  75 mg Oral Daily     lisinopril  10 mg Oral Daily     lurasidone  20 mg Oral Daily with supper     metoprolol succinate ER  25 mg Oral Daily     rivaroxaban ANTICOAGULANT  20 mg Oral Daily with supper     rosuvastatin  10 mg Oral Daily       PRN Medications (summary):  acetaminophen, alum & mag hydroxide-simethicone, famotidine, hydrOXYzine, OLANZapine **OR** OLANZapine, polyethylene glycol, traZODone, [Held by provider] zolpidem    Discontinued Medications (& Rationale):    none    Consults:    none    Legal Status:    Voluntary     SIO:    No, has required one previously this admission for CPAP    Pt has not required locked seclusion or restraints in the past 24 hours to maintain safety, please refer to RN documentation for further details.    Disposition:    TBD, pending clinical stabilization, medication optimization, and formulation of a safe discharge plan.      Safety Assessment:   Behavioral Orders   Procedures     Assault precautions     Code 1 - Restrict to Unit     Routine Programming     As clinically indicated     Self Injury Precaution     Status 15     Every 15 minutes.     Status Individual Observation     Only during night time    Patient SIO status reviewed with team/RN.  Please also refer to RN/team documentation for add'l detail.    -SIO staff to monitor following which have contributed to patient being on SIO:  Using CPAP for night sleep    -When following observed, team will review discontinuation of SIO:  When CPAP is not needed     Order Specific Question:   CONTINUOUS 24 hours / day     Answer:   5 feet     Order Specific Question:   Indications for SIO     Answer:   Medical equipment / ligature risk     Suicide precautions     Patients on Suicide Precautions should have a Combination Diet ordered that includes a Diet selection(s) AND a Behavioral Tray selection for Safe Tray - with utensils, or Safe Tray - NO utensils        ____________________________________________________________________  Pertinent Medical diagnoses and management:    Previous DVT    rivaroxaban ANTICOAGULANT, 20 mg, Daily with supper    ____________________________________________________________________  This patient was seen and discussed with my resident, Dr. Paulino, and attending physician Dr. Porter.     Barak Grullon, MS3  [facility] [employer]     Attestation:   I was present with the medical student who participated in the service and in the documentation of the note.  I have verified the history and personally performed the physical exam and medical decision making. I agree with the assessment and plan of care as documented in the note.    Irene Paulino MD, PGY2,  Psychiatry Resident    This patient has been seen and evaluated by me, Devonte Porter.  I have discussed this patient with the psychiatry resident and I agree with the findings and plan  in this note.    I have reviewed today's vital signs, medications, labs and imaging.     Devonte Oates MD on 7/13/2022 at 4:07 PM

## 2022-07-14 PROCEDURE — 99232 SBSQ HOSP IP/OBS MODERATE 35: CPT | Mod: GC | Performed by: PSYCHIATRY & NEUROLOGY

## 2022-07-14 PROCEDURE — 124N000002 HC R&B MH UMMC

## 2022-07-14 PROCEDURE — 250N000013 HC RX MED GY IP 250 OP 250 PS 637

## 2022-07-14 PROCEDURE — G0177 OPPS/PHP; TRAIN & EDUC SERV: HCPCS

## 2022-07-14 RX ORDER — LURASIDONE HYDROCHLORIDE 20 MG/1
40 TABLET, FILM COATED ORAL
Status: DISCONTINUED | OUTPATIENT
Start: 2022-07-14 | End: 2022-07-15 | Stop reason: HOSPADM

## 2022-07-14 RX ADMIN — TRAZODONE HYDROCHLORIDE 50 MG: 50 TABLET ORAL at 20:05

## 2022-07-14 RX ADMIN — ROSUVASTATIN CALCIUM 10 MG: 10 TABLET, FILM COATED ORAL at 20:02

## 2022-07-14 RX ADMIN — BUPROPION HYDROCHLORIDE 75 MG: 75 TABLET, FILM COATED ORAL at 07:58

## 2022-07-14 RX ADMIN — LISINOPRIL 10 MG: 10 TABLET ORAL at 20:03

## 2022-07-14 RX ADMIN — LURASIDONE HYDROCHLORIDE 40 MG: 20 TABLET, FILM COATED ORAL at 18:30

## 2022-07-14 RX ADMIN — RIVAROXABAN 20 MG: 10 TABLET, FILM COATED ORAL at 18:32

## 2022-07-14 RX ADMIN — BUPROPION HYDROCHLORIDE 150 MG: 150 TABLET, EXTENDED RELEASE ORAL at 07:59

## 2022-07-14 RX ADMIN — METOPROLOL SUCCINATE 25 MG: 25 TABLET, EXTENDED RELEASE ORAL at 20:03

## 2022-07-14 ASSESSMENT — ACTIVITIES OF DAILY LIVING (ADL)
ADLS_ACUITY_SCORE: 31

## 2022-07-14 NOTE — PLAN OF CARE
Patient appears sleeping,resting in bed  during rounds. Patient uses CPAP @ night when sleeping- tolerating well. No respiratory distress noted. Patient no complaints of pain and discomfort. No mental health symptoms observed so far. No safety or behavioral issues noted. Will continue to monitor patient's status and provide therapeutic interventions as needed.   Patient had 7 total hours of sleep this shift.   Problem: Sleep Disturbance  Goal: Adequate Sleep/Rest  Outcome: Ongoing, Progressing

## 2022-07-14 NOTE — PLAN OF CARE
"Pt has been visible in the milieu throughout the day and has been actively participating in groups. While in the milieu and in groups, he is minimally social but does engage with others when they initiate interactions with him. During interactions, he appears anxious and tense but will smile and be appropriate and polite. He stated that he slept \"really well\" last night, which he attributed to the PRN Trazodone he took at HS. He stated his mood as \"emotionally stable.\" Disposition plan is for him to possibly discharge as soon as tomorrow back to home. Pt has had no instances of verbal or physical aggression during his stay here.     Problem: Behavioral Health Plan of Care  Goal: Plan of Care Review  Recent Flowsheet Documentation  Taken 7/14/2022 1417 by Kimmy Colon  Plan of Care Reviewed With: patient     "

## 2022-07-14 NOTE — PROGRESS NOTES
"    ----------------------------------------------------------------------------------------------------------  [facility]  Psychiatric Progress Note  Hospital Day #2    Charles Schoeneberger MRN# [id number]   Age: 46 year old YOB: 1975   Date of Admission: 7/8/2022     Subjective   The patient was discussed with the treatment team and chart notes were reviewed.      Identifier: Charles Schoeneberger is a 46 year old  male with a past psychiatric history of major depression, suicidal ideation, and suicide attempt admitted from the  ER on 07/12/2022 due to concern for SI, out of control behaviors and aggression in the context of psychosocial stressors including loss and chronic mental health issues. . Patient is on hospital day #3. Assessment is ongoing with an evolving differential including adjustment disorder vs adult ADHD vs intermittent explosive disorder.  Sleep:  7 hours (07/14/2022)  Prescribed Medications: Taken as prescribed )  PRN Psychiatric Medications: acetaminophen, alum & mag hydroxide-simethicone, famotidine, hydrOXYzine, OLANZapine **OR** OLANZapine, polyethylene glycol, traZODone, [Held by provider] zolpidem       Overnight Nursing Notes/Staff Report:    \"Patient appears sleeping,resting in bed  during rounds. Patient uses CPAP @ night when sleeping- tolerating well. No respiratory distress noted. Patient no complaints of pain and discomfort. No mental health symptoms observed so far. No safety or behavioral issues noted. Will continue to monitor patient's status and provide therapeutic interventions as needed.   Patient had 7 total hours of sleep this shift. \"    \" Pt present in the milieu, socialized with selected peers,  and did not  participated in group activities despite encouragement from staff. Pt endorsed depression 3/10 and denies all other psych symptoms and contracted for safety. Pt used self meditation and pt stated it help improved " "his mood. Speech clear and coherent and maintained flat affect. Received prn trazodone for sleep and cooperated with all medications. Good appetite and came out for HS snacks.\"    \"Pt has been intermittently visible in the milieu today; he attended dance movement group and OT clinic. He struggled to participate fully but attempted to and engaged by watching others participate. He presented with flat, tense affect and anxious mood during his interactions with writer and other patients. Around noon he approached writer stating he was feeling \"emotionally unstable\" and wanted a medication to help him. He expanded on this by saying he felt more \"tearful\" and \"on edge.\" He requested PRN Hydroxyzine (25mg) and stated that it had worked well for him yesterday. Appeared effective for him today as well.\"    Patient interview:  Patient was interviewed in the conference room today. Stated he was feeling better today but that he tends to experience his anxiety in the afternoon as he did the previous day. Patient denied any feelings of SI or anger since the last meeting. Patient endorsed feeling \"mild to moderate\" dizziness after taking his Latuda yesterday. Patient was informed that dizziness and hypotension is a typical side effect experienced in the first few days of beginning the medication but that it would likely decrease over the coming days. Team discussed increasing the dose to 40 mg from 20 mg and the patient consented. Patient was informed that the plan was still a potential discharge to home for tomorrow (7/15). Patient was informed that his wife would be called and updated today.     Patient's spouse was contacted today by phone and given a brief update including the plan for a possible discharge tomorrow.      ROS   ROS was negative unless noted above.       Objective   Vitals:  Temp: 97.2  F (36.2  C) (Temp  Min: 97.7  F (36.5  C)  Max: 97.7  F (36.5  C))  Resp: 16 (Resp  Min: 16  Max: 16)  SpO2: 100 % (SpO2  Min: " "100 %  Max: 100 %)  Pulse: 83 (Pulse  Min: 78  Max: 83)  BP: 129/83  Systolic (24hrs), Av , Min:109 , Max:129   Diastolic (24hrs), Av, Min:73, Max:83    Mental Status Examination:  Oriented to:  Grossly Oriented  General: Awake and Alert  Appearance:  appears stated age, Grooming is adequate and Dressed in hospital scrubs  Behavior:  calm, cooperative and engaged  Eye Contact:  good  Psychomotor:  restless and fidgeting; no catatonia present  Speech:  soft volume/tone  Language: Fluent in English with appropriate syntax and vocabulary.  Mood:  \"good\"  Affect:  appropriate, congruent with mood, sad and tearful  Thought Process:  coherent, linear, logical and goal directed  Thought Content: No SI/HI/AH/VH and does not appear to be responding to internal stimuli; No apparent delusions  Associations:  intact  Insight:  good  Judgment:  good  Attention Span: grossly intact  Concentration:  grossly intact  Recent and Remote Memory:  grossly intact, but endorsed transient periods of short-term memory loss recently  Fund of Knowledge: average     Allergies     Allergies   Allergen Reactions     Prochlorperazine Anaphylaxis     \"I needed epi to be brought back\"     Cats      Dogs      Pollen Extract      Seasonal Allergies         Labs & Imaging   No results found for this or any previous visit (from the past 24 hour(s)).    Trending Labs: none     Assessment   Diagnostic Impression:  Charles Schoeneberger is a 46 year old  male with a past psychiatric history of major depression, suicidal ideation, and suicide attempt admitted from the  ER on 2022 due to concern for SI, out of control behaviors and aggression in the context of psychosocial stressors including loss and chronic mental health issues.   Significant symptoms include SI, SIB, aggression, depressed, mood lability, sleep issues, poor frustration tolerance and impulsive. His last psychiatric hospitalization was in .  Current psychosocial " "stressors include loss, chronic mental health issues and family dynamics which he has been coping with by SIB, acting out to self, acting out to others and aggression.  Patient's support system includes family.  Substance use does not appear to be playing a contributing role in the patient's presentation.  There is no known genetic loading. Medical history does appear to be significant for MDD, DVT, and Hodgkin's Lymphoma. The MSE is notable for passive suicidal ideation, fidgeting, tearfullness, and feeling of \"emotionally unstable\". He reports self injurious behaviors of slapping himself in the face. His definitive diagnosis is still in evolution; differential includes adult ADHD w/ concurrent major depressive disorder, intermittent explosive disorder, and adjustment disorder.  Additionally, he has traits of major depressive disorder which interfere with his ability to adhere with the treatment plan.  Optimization of medications to target these symptom clusters in addition to evaluation of adquate outpatient supports will be targets for treatment during this admission.      Given that he currently has SI, out of control behaviors and aggression, patient warrants inpatient psychiatric hospitalization to maintain his safety. Disposition pending clinical stabilization, medication optimization and development of an appropriate discharge plan.       Principal Diagnosis:     MDD    Secondary psychiatric diagnoses of concern this admission:     Adult ADHD    Intermittent explosive disorder    Adjustment Disorder    Psychiatric course:  Charles Schoeneberger was admitted to [facility] as a voluntary patient. His PTA buproprion was continued.     Charles Schoeneberger continued to meet criteria for inpatient hospitalization, medication optimization, inpatient stabilization, and appropriate discharge planning.    (7/13/2022) - Start lurasidone (LATUDA) 20 mg once per day WITH SUPPER  (7/14/2022) - lurasidone increased to 40 " mg once per day WITH SUPPER     Medical course:   Charles Schoeneberger was physically examined by the ED prior to being transferred to the unit and was found to be medically stable and appropriate for admission.      Plan   Today's Changes:     Increased lurasidone (Latuda) from 20 mg to 40 mg once per day  _______________________________________________________________________  Psychiatric diagnoses and management:  Principal Diagnosis:     Major Depressive Disorder recurrent  o Buproprion, 150 mg, daily; buproprion, 75 mg, daily  o lurasidone (LATUDA), 40 mg, once per day WITH SUPPER    Secondary Psychiatric Diagnoses:     Evolving differential including most likely adjustment disorder    Additional Planning:    Continue to monitor for and stabilize: SI, SIB, aggression, irritable, mood lability and poor frustration tolerance    Patient will be treated in therapeutic milieu with appropriate individual and group therapies as described.    Scheduled Medications (summary):    buPROPion  150 mg Oral Daily     buPROPion  75 mg Oral Daily     lisinopril  10 mg Oral Daily     lurasidone  20 mg Oral Daily with supper     metoprolol succinate ER  25 mg Oral Daily     rivaroxaban ANTICOAGULANT  20 mg Oral Daily with supper     rosuvastatin  10 mg Oral Daily       PRN Medications (summary):  acetaminophen, alum & mag hydroxide-simethicone, famotidine, hydrOXYzine, OLANZapine **OR** OLANZapine, polyethylene glycol, traZODone, [Held by provider] zolpidem    Discontinued Medications (& Rationale):    none    Consults:    none    Legal Status:    Voluntary     SIO:    No, has required one previously this admission for CPAP    Pt has not required locked seclusion or restraints in the past 24 hours to maintain safety, please refer to RN documentation for further details.    Disposition:    TBD, pending clinical stabilization, medication optimization, and formulation of a safe discharge plan.     Safety Assessment:   Behavioral  Orders   Procedures     Assault precautions     Code 1 - Restrict to Unit     Routine Programming     As clinically indicated     Self Injury Precaution     Status 15     Every 15 minutes.     Suicide precautions     Patients on Suicide Precautions should have a Combination Diet ordered that includes a Diet selection(s) AND a Behavioral Tray selection for Safe Tray - with utensils, or Safe Tray - NO utensils        ____________________________________________________________________  Pertinent Medical diagnoses and management:    Previous DVT    rivaroxaban ANTICOAGULANT, 20 mg, Daily with supper    ____________________________________________________________________  This patient was seen and discussed with my resident, Dr. Paulino, and attending physician Dr. Mann.     Barak Grullon, MS3  [employer]     Attestation:   I was present with the medical student who participated in the service and in the documentation of the note.  I have verified the history and personally performed the physical exam and medical decision making. I agree with the assessment and plan of care as documented in the note.    Irene Paulino MD, PGY2,  Psychiatry Resident    Attestation:  This patient has been seen and evaluated by me, Itzel Mann MD.  I have discussed this patient with the house staff team including the resident and medical student and I agree with the findings and plan in this note.    I have reviewed today's vital signs, medications, labs and imaging. Itzel Mann MD , PhD.

## 2022-07-14 NOTE — DISCHARGE INSTRUCTIONS
Behavioral Discharge Planning and Instructions    Summary: You were admitted on 7/8/2022  due to Suicidal Ideations.  You were treated by psychiatry and discharged on 7/15/2022 from [facility] to Home    Main Diagnosis: Major Depressive Disorder    Health Care Follow-up:   Your Partial Hospitalization Program through [facility] start date is scheduled for July 25.  Appointment Type: Couples Therapy  Provider: Jamie Sheth MA, LP   Date & Time:Tuesday July 19, 2022 at 6pm.  Clinic:  [address]   [place] [address]   [phone]       Appointment Type: Therapy  Provider: MILO Moe  Date and Time: Friday July 15, 2022 at 4pm  Clinic: [facility]  [address], [place] [address]   [phone]   NOTE: This appointment is by telehealth.     Appointment Type: Medication Management  Provider: Dexter Hall  Date & Time: Wednesday July 20, 2022 at 12:40pm  Clinic: [facility]  [address], [place] [address]   [phone]   NOTE: This appointment is by telehealth.  Attend all scheduled appointments with your outpatient providers. Call at least 24 hours in advance if you need to reschedule an appointment to ensure continued access to your outpatient providers.     Major Treatments, Procedures and Findings:  You were provided with: a psychiatric assessment, medication evaluation and/or management, group therapy, and milieu management    Symptoms to Report: feeling more aggressive, increased confusion, losing more sleep, mood getting worse, or thoughts of suicide    Early warning signs can include: increased depression or anxiety sleep disturbances increased thoughts or behaviors of suicide or self-harm  increased unusual thinking, such as paranoia or hearing voices    Safety and Wellness:  Take all medicines as directed.  Make no changes unless your doctor suggests them.      Follow treatment recommendations.  Refrain from alcohol and  non-prescribed drugs.  If there is a concern for safety, call 911.    Resources:   Crisis Intervention: 228.497.5096 or 635-267-2101 (TTY: 511.405.9484).  Call anytime for help.  [employer] [place] [employer] Mental Illness (www.[place].eun.org): 112.991.3893 or 586-474-4664.  [facility] Crisis Response [phone]  [facility] Crisis Response [phone]  [facility] Crisis (COPE) Response - Adult [phone]  [facility] Crisis Response - Adult [phone]    General Medication Instructions:   See your medication sheet(s) for instructions.   Take all medicines as directed.  Make no changes unless your doctor suggests them.   Go to all your doctor visits.  Be sure to have all your required lab tests. This way, your medicines can be refilled on time.  Do not use any drugs not prescribed by your doctor.  Avoid alcohol.    Advance Directives:   Scanned document on file with [facility]? Yes, scanned ACP docmt  Is document scanned? Pt states no documents  Honoring Choices Your Rights Handout: Informed and given  Was more information offered? Pt declined    The Treatment team has appreciated the opportunity to work with you. If you have any questions or concerns about your recent admission, you can contact the unit which can receive your call 24 hours a day, 7 days a week. They will be able to get in touch with a Provider if needed. The unit number is [phone] .

## 2022-07-14 NOTE — PLAN OF CARE
"  Problem: Anxiety  Goal: Anxiety Reduction or Resolution  Outcome: Ongoing, Progressing     Problem: Depression  Goal: Improved Mood  Outcome: Ongoing, Progressing     Problem: Suicidal Behavior  Goal: Suicidal Behavior is Absent or Managed  Outcome: Ongoing, Progressing   Goal Outcome Evaluation:    Patient mostly  Isolative and withdrawn to room at the beginning of the shift, came out for snacks and stayed out in the milieu most part of the shift, selectively engaged with peers but very soft spoken and quite, denies all psych symptoms,  Patient did state \" I feel good now\" looking forward to discharging home tomorrow, denies pain, iris for safety, safety check in progress, patient is able to make needs known, medication compliant with no observed or sated side effects, PRN Trazodone given, patient uses C-PAP at night , has 1:1 with that, no other known concerns at this time, will monitor and offer support as needed for the rest of the shift.                "

## 2022-07-14 NOTE — PLAN OF CARE
[facility] Occupational Therapy Group Intervention Note     07/14/22 1134   Group Therapy Session   Group Attendance attended group session   Total Time patient participated (minutes) 45   Group Type task skill;life skill   Group Topic Covered coping skills/lifestyle management;structured socialization;relaxation techniques   Group Session Detail clinic - coping skill exploration, creative expression within personally meaningful activities, and observation of social, cognitive, and kinesthetic performance skills   Patient Response/Contribution cooperative with task; organized; calm and content; chose motivating project topic entitled 'trust yourself'; social upon approach     Alla Gutierrez OT on 7/14/2022 at 11:35 AM

## 2022-07-14 NOTE — PLAN OF CARE
Assessment/Intervention/Current Symptoms and Care Coordination  Writer met with patient and consulted with psychiatrist during team meeting. Writer introduced self as coverage worker until primary  returns. Writer continued to build rapport with patient. Patient is agreeable to writer confirming upcoming mental health appointments. Writer is waiting for call back from [facility] to confirm next appointment. Patient presented as cooperative, and reports that he would be ready for discharge tomorrow. Treatment team would like to observe another day.    Discharge Plan or Goal  Return home with outpatient supports      Barriers to Discharge   Symptom Stabilization, Medication Management, Care Coordination    Referral Status    Your Partial Hospitalization Program through [facility] start date is scheduled for July 25.    Appointment Type: Couples Therapy  Provider: Jamie Sheth MA, DOM   Date & Time:  Clinic:  [address]   [place], [place] [id number] [phone]       Appointment Type: Therapy  Provider: MILO Moe  Date and Time: Friday July 15, 2022 at 4pm  Clinic: [facility]  [address] [place] [address]   [phone]   NOTE: This appointment is by telehealth.     Appointment Type: Medication Management  Provider: Dexter Hall  Date & Time: Wednesday July 20, 2022 at 12:40pm  Clinic: [facility]  [address] [place] [address]   [phone]   NOTE: This appointment is by telehealth.      Legal Status  voluntary      ANGE Dow [facility], 7/14/2022, 11:41 AM

## 2022-07-15 VITALS
TEMPERATURE: 97.8 F | OXYGEN SATURATION: 98 % | SYSTOLIC BLOOD PRESSURE: 97 MMHG | RESPIRATION RATE: 16 BRPM | HEART RATE: 72 BPM | DIASTOLIC BLOOD PRESSURE: 64 MMHG

## 2022-07-15 PROCEDURE — 250N000013 HC RX MED GY IP 250 OP 250 PS 637

## 2022-07-15 PROCEDURE — 99238 HOSP IP/OBS DSCHRG MGMT 30/<: CPT | Mod: GC | Performed by: PSYCHIATRY & NEUROLOGY

## 2022-07-15 PROCEDURE — G0177 OPPS/PHP; TRAIN & EDUC SERV: HCPCS

## 2022-07-15 RX ORDER — BUPROPION HYDROCHLORIDE 150 MG/1
150 TABLET ORAL DAILY
Qty: 30 TABLET | Refills: 0 | Status: SHIPPED | OUTPATIENT
Start: 2022-07-16

## 2022-07-15 RX ORDER — HYDROXYZINE HYDROCHLORIDE 25 MG/1
25 TABLET, FILM COATED ORAL EVERY 4 HOURS PRN
Qty: 30 TABLET | Refills: 0 | Status: SHIPPED | OUTPATIENT
Start: 2022-07-15

## 2022-07-15 RX ORDER — BUPROPION HYDROCHLORIDE 75 MG/1
75 TABLET ORAL DAILY
Qty: 30 TABLET | Refills: 0 | Status: SHIPPED | OUTPATIENT
Start: 2022-07-16

## 2022-07-15 RX ORDER — TRAZODONE HYDROCHLORIDE 50 MG/1
50 TABLET, FILM COATED ORAL
Qty: 30 TABLET | Refills: 0 | Status: SHIPPED | OUTPATIENT
Start: 2022-07-15

## 2022-07-15 RX ORDER — LURASIDONE HYDROCHLORIDE 40 MG/1
40 TABLET, FILM COATED ORAL
Qty: 30 TABLET | Refills: 0 | Status: SHIPPED | OUTPATIENT
Start: 2022-07-15

## 2022-07-15 RX ADMIN — BUPROPION HYDROCHLORIDE 150 MG: 150 TABLET, EXTENDED RELEASE ORAL at 09:07

## 2022-07-15 RX ADMIN — BUPROPION HYDROCHLORIDE 75 MG: 75 TABLET, FILM COATED ORAL at 09:07

## 2022-07-15 ASSESSMENT — ACTIVITIES OF DAILY LIVING (ADL)
ADLS_ACUITY_SCORE: 31
LAUNDRY: UNABLE TO COMPLETE
ADLS_ACUITY_SCORE: 31
ORAL_HYGIENE: INDEPENDENT
ADLS_ACUITY_SCORE: 31
HYGIENE/GROOMING: INDEPENDENT
ADLS_ACUITY_SCORE: 31
DRESS: INDEPENDENT
ADLS_ACUITY_SCORE: 31

## 2022-07-15 NOTE — PLAN OF CARE
Problem: Sleep Disturbance  Goal: Adequate Sleep/Rest  Outcome: Ongoing, Progressing    Patient slept approximately 7 hours, safety checks and precautions in place, no prn given or requested this shift, no other known safety concerns, will monitor as needed

## 2022-07-15 NOTE — PLAN OF CARE
Notified by nursing that patient did not have an SIO order although he is using CPAP. Per team notes, he is no longer suicidal and will likely be discharging tomorrow. However, it was decided with writer and nursing staff to reinitiate SIO overnight again per floor policy.
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
## 2022-07-15 NOTE — PLAN OF CARE
Pt discharged to home as per orders. Picked up by wife at about 1:25 PM.     Pt was alert and oriented. Denies SI/SIB nor hearing voices. Pt denies anxiety or depression. Discharge paperwork discussed with pt and he did not have any questions at the time. All belongings returned to pt with no issue. All discharge medications received and signed out for.     Pt does not have guns available to him.  His coping mechanisms are settling himself down by reading, deep breathing and exercise. He also likes petting the dog.  His support system in place are his wife, his dogs and his therapist.       Goal Outcome Evaluation:    Plan of Care Reviewed With: patient

## 2022-07-15 NOTE — PLAN OF CARE
Discharge plan or goal: Return home with outpatient supports      Today's Plan: Writer met with patient and reviewed upcoming appointments.Writer reviewed follow up appts in community. He reports being ready for discharge and will participate in therapy appt this afternoon at 4pm. His wife will pick him up and writer provided  instruction to wife.    Pt has the following outpatient appointment(s) scheduled:   Partial Hospitalization Program through [facility] start date is scheduled forJuly 25.     Appointment Type: Couples Therapy  Provider: Jamie Sheth MA, DOM   Date & Time: Tuesday July 19, 2022 at 6pm.  Clinic:  [address]   [place] [address]   [phone]       Appointment Type: Therapy  Provider: MILO Moe  Date and Time: Friday July 15, 2022 at 4pm  Clinic: [facility]  [address], [place] [address]   [phone]   NOTE: This appointment is by telehealth.     Appointment Type: Medication Management  Provider: Dexter Hall  Date & Time: Wednesday July 20, 2022 at 12:40pm  Clinic: [facility]  [address], [place] [address]   [phone]   NOTE: This appointment is by telehealth.  Pt has the following professional community support(s):  PHP, psychiatrist, couples therapist, individual therapist.    Legal Status: voluntary    Diagnosis/Procedure:   Patient Active Problem List   Diagnosis     Suicidal ideation       Care Rounds Attendance:   [facility]   RN   Charge RN   OT/TR  MD/CNP    [facility], 7/15/2022, 12:30 PM

## 2022-07-16 DIAGNOSIS — Z71.89 OTHER SPECIFIED COUNSELING: ICD-10-CM

## 2022-07-18 ENCOUNTER — PATIENT OUTREACH (OUTPATIENT)
Dept: CARE COORDINATION | Facility: CLINIC | Age: 47
End: 2022-07-18

## 2022-07-18 NOTE — PROGRESS NOTES
[facility]    Background: Care Coordination referral placed from [facility] discharge report for reason of patient meeting criteria for a TCM outreach call by [facility] team.    Assessment: Upon chart review, [facility] Team member will cancel/close the referral for TCM outreach due to reason below:    Patient has a follow up appointment with an appropriate provider for hospital discharge    Per AVS:  Appointment Type: Therapy  Provider: MILO Moe  Date and Time: Friday July 15, 2022 at 4pm  Clinic: [facility]  [address], [place], [place] [id number] [phone]  NOTE: This appointment is by telehealth.    Plan: Care Coordination referral for TCM outreach canceled.      ALLIE Alfonso  [facility], [facility]    *[facility] does NOT follow patient ongoing. Referrals are identified based on internal discharge reports and the outreach is to ensure patient has an understanding of their discharge instructions.